# Patient Record
Sex: MALE | Race: WHITE | NOT HISPANIC OR LATINO
[De-identification: names, ages, dates, MRNs, and addresses within clinical notes are randomized per-mention and may not be internally consistent; named-entity substitution may affect disease eponyms.]

---

## 2024-05-28 NOTE — HISTORY OF PRESENT ILLNESS
[FreeTextEntry1] : HECTOR KRAFT Dec 12 1955   Language: English Date of First visit: 05/29/2024 Accompanied by: self Contact info: Referring Provider/PCP:  Fax:   DAGO/KRISTINA Problem List:  BPH / LUTS =============================================================================== FIRST VISIT / Summary: Very pleasant 68 year old M here for discussion of aquablation for LUTS.   ------------------------------------------------------------------------------------------- INTERVAL VISITS:   ===============================================================================   PMH: Meds: All: FHx: SocHx:   PSH:   ROS: Review of Systems is as per HPI unless otherwise denoted below   =============================================================================== DATA: LABS (SELECTED):---------------------------------------------------------------------------------------------------     RADS:-------------------------------------------------------------------------------------------------------------------     PATHOLOGY/CYTOLOGY:-------------------------------------------------------------------------------------------     VOIDING STUDIES: ----------------------------------------------------------------------------------------------------     STONE STUDIES: (Analysis/LLSA)----------------------------------------------------------------------------------     PROCEDURES: -----------------------------------------------------------------------------------------------       =============================================================================== PHYSICAL EXAM:    FOCUSED: ----------------------------------------------------------------------------------------------------------------     ======================================================================================= DISCUSSION: ======================================================================================= ASSESSMENT and PLAN   1.   2.   3.   =======================================================================================   Thank you for allowing me to assist in the care of your patient. Should you have any questions please do not hesitate to reach out to me.     John Foster MD                                                         [facility]   [place] Office: [address], [address] [place] [address] T: [phone] F: [phone]   [place] Office: [address] [place] [address], [address] [place] [address] T: [phone] [phone]

## 2024-05-29 ENCOUNTER — APPOINTMENT (OUTPATIENT)
Dept: UROLOGY | Facility: CLINIC | Age: 69
End: 2024-05-29